# Patient Record
Sex: FEMALE | Race: WHITE | NOT HISPANIC OR LATINO | Employment: FULL TIME | URBAN - NONMETROPOLITAN AREA
[De-identification: names, ages, dates, MRNs, and addresses within clinical notes are randomized per-mention and may not be internally consistent; named-entity substitution may affect disease eponyms.]

---

## 2024-05-06 ENCOUNTER — APPOINTMENT (OUTPATIENT)
Dept: CARDIOLOGY | Facility: HOSPITAL | Age: 59
End: 2024-05-06

## 2024-05-06 ENCOUNTER — HOSPITAL ENCOUNTER (INPATIENT)
Facility: HOSPITAL | Age: 59
LOS: 1 days | Discharge: HOME | End: 2024-05-07
Attending: EMERGENCY MEDICINE | Admitting: INTERNAL MEDICINE

## 2024-05-06 ENCOUNTER — APPOINTMENT (OUTPATIENT)
Dept: RADIOLOGY | Facility: HOSPITAL | Age: 59
End: 2024-05-06

## 2024-05-06 DIAGNOSIS — I26.99 ACUTE PULMONARY EMBOLISM, UNSPECIFIED PULMONARY EMBOLISM TYPE, UNSPECIFIED WHETHER ACUTE COR PULMONALE PRESENT (MULTI): Primary | ICD-10-CM

## 2024-05-06 DIAGNOSIS — I26.94 MULTIPLE SUBSEGMENTAL PULMONARY EMBOLI WITHOUT ACUTE COR PULMONALE (MULTI): ICD-10-CM

## 2024-05-06 LAB
ALBUMIN SERPL BCP-MCNC: 4 G/DL (ref 3.4–5)
ALP SERPL-CCNC: 84 U/L (ref 33–110)
ALT SERPL W P-5'-P-CCNC: 7 U/L (ref 7–45)
ANION GAP SERPL CALC-SCNC: 11 MMOL/L (ref 10–20)
AST SERPL W P-5'-P-CCNC: 10 U/L (ref 9–39)
BASOPHILS # BLD AUTO: 0.01 X10*3/UL (ref 0–0.1)
BASOPHILS NFR BLD AUTO: 0.1 %
BILIRUB SERPL-MCNC: 0.5 MG/DL (ref 0–1.2)
BNP SERPL-MCNC: 22 PG/ML (ref 0–99)
BUN SERPL-MCNC: 6 MG/DL (ref 6–23)
CALCIUM SERPL-MCNC: 8.7 MG/DL (ref 8.6–10.3)
CARDIAC TROPONIN I PNL SERPL HS: 3 NG/L (ref 0–13)
CARDIAC TROPONIN I PNL SERPL HS: 3 NG/L (ref 0–13)
CHLORIDE SERPL-SCNC: 103 MMOL/L (ref 98–107)
CO2 SERPL-SCNC: 25 MMOL/L (ref 21–32)
CREAT SERPL-MCNC: 0.75 MG/DL (ref 0.5–1.05)
EGFRCR SERPLBLD CKD-EPI 2021: >90 ML/MIN/1.73M*2
EOSINOPHIL # BLD AUTO: 0.02 X10*3/UL (ref 0–0.7)
EOSINOPHIL NFR BLD AUTO: 0.2 %
ERYTHROCYTE [DISTWIDTH] IN BLOOD BY AUTOMATED COUNT: 13.8 % (ref 11.5–14.5)
GLUCOSE SERPL-MCNC: 123 MG/DL (ref 74–99)
HCT VFR BLD AUTO: 37.1 % (ref 36–46)
HGB BLD-MCNC: 12.6 G/DL (ref 12–16)
HOLD SPECIMEN: NORMAL
IMM GRANULOCYTES # BLD AUTO: 0.02 X10*3/UL (ref 0–0.7)
IMM GRANULOCYTES NFR BLD AUTO: 0.2 % (ref 0–0.9)
LYMPHOCYTES # BLD AUTO: 1.4 X10*3/UL (ref 1.2–4.8)
LYMPHOCYTES NFR BLD AUTO: 16.9 %
MCH RBC QN AUTO: 30.7 PG (ref 26–34)
MCHC RBC AUTO-ENTMCNC: 34 G/DL (ref 32–36)
MCV RBC AUTO: 91 FL (ref 80–100)
MONOCYTES # BLD AUTO: 0.42 X10*3/UL (ref 0.1–1)
MONOCYTES NFR BLD AUTO: 5.1 %
NEUTROPHILS # BLD AUTO: 6.39 X10*3/UL (ref 1.2–7.7)
NEUTROPHILS NFR BLD AUTO: 77.5 %
NRBC BLD-RTO: 0 /100 WBCS (ref 0–0)
PLATELET # BLD AUTO: 250 X10*3/UL (ref 150–450)
POTASSIUM SERPL-SCNC: 3.8 MMOL/L (ref 3.5–5.3)
PROT SERPL-MCNC: 6.8 G/DL (ref 6.4–8.2)
RBC # BLD AUTO: 4.1 X10*6/UL (ref 4–5.2)
SODIUM SERPL-SCNC: 135 MMOL/L (ref 136–145)
WBC # BLD AUTO: 8.3 X10*3/UL (ref 4.4–11.3)

## 2024-05-06 PROCEDURE — 84484 ASSAY OF TROPONIN QUANT: CPT | Performed by: EMERGENCY MEDICINE

## 2024-05-06 PROCEDURE — 36415 COLL VENOUS BLD VENIPUNCTURE: CPT | Performed by: EMERGENCY MEDICINE

## 2024-05-06 PROCEDURE — 85025 COMPLETE CBC W/AUTO DIFF WBC: CPT | Performed by: EMERGENCY MEDICINE

## 2024-05-06 PROCEDURE — 99285 EMERGENCY DEPT VISIT HI MDM: CPT | Mod: 25

## 2024-05-06 PROCEDURE — 2500000004 HC RX 250 GENERAL PHARMACY W/ HCPCS (ALT 636 FOR OP/ED)

## 2024-05-06 PROCEDURE — 93005 ELECTROCARDIOGRAM TRACING: CPT

## 2024-05-06 PROCEDURE — 71275 CT ANGIOGRAPHY CHEST: CPT | Performed by: STUDENT IN AN ORGANIZED HEALTH CARE EDUCATION/TRAINING PROGRAM

## 2024-05-06 PROCEDURE — 71045 X-RAY EXAM CHEST 1 VIEW: CPT | Performed by: RADIOLOGY

## 2024-05-06 PROCEDURE — 80053 COMPREHEN METABOLIC PANEL: CPT | Performed by: EMERGENCY MEDICINE

## 2024-05-06 PROCEDURE — 71045 X-RAY EXAM CHEST 1 VIEW: CPT

## 2024-05-06 PROCEDURE — 71275 CT ANGIOGRAPHY CHEST: CPT

## 2024-05-06 PROCEDURE — 2550000001 HC RX 255 CONTRASTS: Performed by: EMERGENCY MEDICINE

## 2024-05-06 PROCEDURE — 1200000002 HC GENERAL ROOM WITH TELEMETRY DAILY: Mod: IPSPLIT

## 2024-05-06 PROCEDURE — 2500000001 HC RX 250 WO HCPCS SELF ADMINISTERED DRUGS (ALT 637 FOR MEDICARE OP): Mod: IPSPLIT

## 2024-05-06 PROCEDURE — 83880 ASSAY OF NATRIURETIC PEPTIDE: CPT | Performed by: EMERGENCY MEDICINE

## 2024-05-06 RX ORDER — OXYCODONE HYDROCHLORIDE 5 MG/1
5 TABLET ORAL EVERY 6 HOURS PRN
Status: DISCONTINUED | OUTPATIENT
Start: 2024-05-06 | End: 2024-05-07 | Stop reason: HOSPADM

## 2024-05-06 RX ORDER — ONDANSETRON HYDROCHLORIDE 2 MG/ML
4 INJECTION, SOLUTION INTRAVENOUS EVERY 8 HOURS PRN
Status: DISCONTINUED | OUTPATIENT
Start: 2024-05-06 | End: 2024-05-07 | Stop reason: HOSPADM

## 2024-05-06 RX ORDER — ACETAMINOPHEN 325 MG/1
650 TABLET ORAL EVERY 4 HOURS PRN
Status: DISCONTINUED | OUTPATIENT
Start: 2024-05-06 | End: 2024-05-07 | Stop reason: HOSPADM

## 2024-05-06 RX ORDER — HEPARIN SODIUM 10000 [USP'U]/100ML
0-4500 INJECTION, SOLUTION INTRAVENOUS CONTINUOUS
Status: DISCONTINUED | OUTPATIENT
Start: 2024-05-06 | End: 2024-05-07 | Stop reason: HOSPADM

## 2024-05-06 RX ORDER — TALC
3 POWDER (GRAM) TOPICAL NIGHTLY PRN
Status: DISCONTINUED | OUTPATIENT
Start: 2024-05-06 | End: 2024-05-07 | Stop reason: HOSPADM

## 2024-05-06 RX ORDER — HEPARIN SODIUM 10000 [USP'U]/100ML
INJECTION, SOLUTION INTRAVENOUS
Status: COMPLETED
Start: 2024-05-06 | End: 2024-05-06

## 2024-05-06 RX ORDER — POLYETHYLENE GLYCOL 3350 17 G/17G
17 POWDER, FOR SOLUTION ORAL DAILY
Status: DISCONTINUED | OUTPATIENT
Start: 2024-05-07 | End: 2024-05-07 | Stop reason: HOSPADM

## 2024-05-06 RX ADMIN — HEPARIN SODIUM 1224 UNITS/HR: 10000 INJECTION, SOLUTION INTRAVENOUS at 19:42

## 2024-05-06 RX ADMIN — IOHEXOL 50 ML: 350 INJECTION, SOLUTION INTRAVENOUS at 19:12

## 2024-05-06 RX ADMIN — HEPARIN SODIUM AND DEXTROSE 1224 UNITS/HR: 10000; 5 INJECTION INTRAVENOUS at 19:42

## 2024-05-06 RX ADMIN — OXYCODONE HYDROCHLORIDE 5 MG: 5 TABLET ORAL at 22:24

## 2024-05-06 SDOH — ECONOMIC STABILITY: TRANSPORTATION INSECURITY
IN THE PAST 12 MONTHS, HAS THE LACK OF TRANSPORTATION KEPT YOU FROM MEDICAL APPOINTMENTS OR FROM GETTING MEDICATIONS?: PATIENT DECLINED

## 2024-05-06 SDOH — SOCIAL STABILITY: SOCIAL INSECURITY: DOES ANYONE TRY TO KEEP YOU FROM HAVING/CONTACTING OTHER FRIENDS OR DOING THINGS OUTSIDE YOUR HOME?: NO

## 2024-05-06 SDOH — HEALTH STABILITY: MENTAL HEALTH: HOW OFTEN DO YOU HAVE 6 OR MORE DRINKS ON ONE OCCASION?: NEVER

## 2024-05-06 SDOH — ECONOMIC STABILITY: INCOME INSECURITY: HOW HARD IS IT FOR YOU TO PAY FOR THE VERY BASICS LIKE FOOD, HOUSING, MEDICAL CARE, AND HEATING?: PATIENT DECLINED

## 2024-05-06 SDOH — HEALTH STABILITY: MENTAL HEALTH: HOW OFTEN DO YOU HAVE A DRINK CONTAINING ALCOHOL?: MONTHLY OR LESS

## 2024-05-06 SDOH — SOCIAL STABILITY: SOCIAL INSECURITY: ABUSE: ADULT

## 2024-05-06 SDOH — SOCIAL STABILITY: SOCIAL INSECURITY: ARE YOU OR HAVE YOU BEEN THREATENED OR ABUSED PHYSICALLY, EMOTIONALLY, OR SEXUALLY BY ANYONE?: NO

## 2024-05-06 SDOH — SOCIAL STABILITY: SOCIAL INSECURITY: HAVE YOU HAD THOUGHTS OF HARMING ANYONE ELSE?: NO

## 2024-05-06 SDOH — ECONOMIC STABILITY: TRANSPORTATION INSECURITY
IN THE PAST 12 MONTHS, HAS LACK OF TRANSPORTATION KEPT YOU FROM MEETINGS, WORK, OR FROM GETTING THINGS NEEDED FOR DAILY LIVING?: PATIENT DECLINED

## 2024-05-06 SDOH — SOCIAL STABILITY: SOCIAL INSECURITY: HAVE YOU HAD ANY THOUGHTS OF HARMING ANYONE ELSE?: NO

## 2024-05-06 SDOH — SOCIAL STABILITY: SOCIAL INSECURITY: WERE YOU ABLE TO COMPLETE ALL THE BEHAVIORAL HEALTH SCREENINGS?: YES

## 2024-05-06 SDOH — ECONOMIC STABILITY: HOUSING INSECURITY
IN THE LAST 12 MONTHS, WAS THERE A TIME WHEN YOU DID NOT HAVE A STEADY PLACE TO SLEEP OR SLEPT IN A SHELTER (INCLUDING NOW)?: PATIENT DECLINED

## 2024-05-06 SDOH — SOCIAL STABILITY: SOCIAL INSECURITY: ARE THERE ANY APPARENT SIGNS OF INJURIES/BEHAVIORS THAT COULD BE RELATED TO ABUSE/NEGLECT?: NO

## 2024-05-06 SDOH — SOCIAL STABILITY: SOCIAL INSECURITY: HAS ANYONE EVER THREATENED TO HURT YOUR FAMILY OR YOUR PETS?: NO

## 2024-05-06 SDOH — SOCIAL STABILITY: SOCIAL INSECURITY: DO YOU FEEL UNSAFE GOING BACK TO THE PLACE WHERE YOU ARE LIVING?: NO

## 2024-05-06 SDOH — SOCIAL STABILITY: SOCIAL INSECURITY: DO YOU FEEL ANYONE HAS EXPLOITED OR TAKEN ADVANTAGE OF YOU FINANCIALLY OR OF YOUR PERSONAL PROPERTY?: NO

## 2024-05-06 SDOH — ECONOMIC STABILITY: INCOME INSECURITY: IN THE LAST 12 MONTHS, WAS THERE A TIME WHEN YOU WERE NOT ABLE TO PAY THE MORTGAGE OR RENT ON TIME?: PATIENT DECLINED

## 2024-05-06 SDOH — HEALTH STABILITY: MENTAL HEALTH: HOW MANY STANDARD DRINKS CONTAINING ALCOHOL DO YOU HAVE ON A TYPICAL DAY?: PATIENT DOES NOT DRINK

## 2024-05-06 SDOH — ECONOMIC STABILITY: HOUSING INSECURITY: IN THE LAST 12 MONTHS, HOW MANY PLACES HAVE YOU LIVED?: 1

## 2024-05-06 ASSESSMENT — COLUMBIA-SUICIDE SEVERITY RATING SCALE - C-SSRS
1. IN THE PAST MONTH, HAVE YOU WISHED YOU WERE DEAD OR WISHED YOU COULD GO TO SLEEP AND NOT WAKE UP?: NO
6. HAVE YOU EVER DONE ANYTHING, STARTED TO DO ANYTHING, OR PREPARED TO DO ANYTHING TO END YOUR LIFE?: NO
2. HAVE YOU ACTUALLY HAD ANY THOUGHTS OF KILLING YOURSELF?: NO

## 2024-05-06 ASSESSMENT — COGNITIVE AND FUNCTIONAL STATUS - GENERAL
PATIENT BASELINE BEDBOUND: NO
MOBILITY SCORE: 24
DAILY ACTIVITIY SCORE: 24

## 2024-05-06 ASSESSMENT — LIFESTYLE VARIABLES
AUDIT-C TOTAL SCORE: 1
HOW MANY STANDARD DRINKS CONTAINING ALCOHOL DO YOU HAVE ON A TYPICAL DAY: PATIENT DOES NOT DRINK
SKIP TO QUESTIONS 9-10: 1
HOW OFTEN DO YOU HAVE 6 OR MORE DRINKS ON ONE OCCASION: NEVER

## 2024-05-06 ASSESSMENT — ACTIVITIES OF DAILY LIVING (ADL)
JUDGMENT_ADEQUATE_SAFELY_COMPLETE_DAILY_ACTIVITIES: YES
GROOMING: INDEPENDENT
TOILETING: INDEPENDENT
FEEDING YOURSELF: INDEPENDENT
ADEQUATE_TO_COMPLETE_ADL: YES
DRESSING YOURSELF: INDEPENDENT
BATHING: INDEPENDENT
HEARING - LEFT EAR: FUNCTIONAL
HEARING - RIGHT EAR: FUNCTIONAL
WALKS IN HOME: INDEPENDENT
PATIENT'S MEMORY ADEQUATE TO SAFELY COMPLETE DAILY ACTIVITIES?: YES

## 2024-05-06 ASSESSMENT — PATIENT HEALTH QUESTIONNAIRE - PHQ9
2. FEELING DOWN, DEPRESSED OR HOPELESS: NOT AT ALL
SUM OF ALL RESPONSES TO PHQ9 QUESTIONS 1 & 2: 0
1. LITTLE INTEREST OR PLEASURE IN DOING THINGS: NOT AT ALL

## 2024-05-06 ASSESSMENT — PAIN DESCRIPTION - LOCATION: LOCATION: CHEST

## 2024-05-06 ASSESSMENT — PAIN - FUNCTIONAL ASSESSMENT
PAIN_FUNCTIONAL_ASSESSMENT: 0-10
PAIN_FUNCTIONAL_ASSESSMENT: 0-10

## 2024-05-06 ASSESSMENT — PAIN DESCRIPTION - PAIN TYPE: TYPE: ACUTE PAIN

## 2024-05-06 ASSESSMENT — PAIN SCALES - GENERAL
PAINLEVEL_OUTOF10: 8
PAINLEVEL_OUTOF10: 7

## 2024-05-06 NOTE — ED TRIAGE NOTES
Pt to ED c/o pain with deep breaths and concern for possible blood clot. Per patient, her friend who has history of PE states she has similar symptoms that she had when she had her blood clot. Patient states her pain began last night and she had a hard time getting out of bed this morning. Pt had difficulty catching her breath. Pt states her pain radiates into her left arm and shoulder, as well as through to her mid-back. Pt describes pain as sharp and 9/10. Pt is a former smoker and has been taking her daughter's birth control as hormone replacement.

## 2024-05-06 NOTE — ED PROVIDER NOTES
HPI   Chief Complaint   Patient presents with    Pain With Breathing     Pain worsens with deep breath, radiates into left chest and arm and through back.       HPI                    [place] Coma Scale Score: 15                     Patient History   History reviewed. No pertinent past medical history.  History reviewed. No pertinent surgical history.  No family history on file.  Social History     Tobacco Use    Smoking status: Former     Types: Cigarettes    Smokeless tobacco: Former   Substance Use Topics    Alcohol use: Not on file    Drug use: Not on file       Physical Exam   ED Triage Vitals [05/06/24 1853]   Temperature Heart Rate Respirations BP   36.9 °C (98.4 °F) 89 16 156/77      SpO2 Temp Source Heart Rate Source Patient Position   98 % Temporal Monitor Sitting      BP Location FiO2 (%)     Left arm --       Physical Exam  Vitals and nursing note reviewed.   Constitutional:       General: She is not in acute distress.     Appearance: She is well-developed.   HENT:      Head: Normocephalic and atraumatic.   Eyes:      Conjunctiva/sclera: Conjunctivae normal.   Cardiovascular:      Rate and Rhythm: Normal rate and regular rhythm.      Heart sounds: No murmur heard.  Pulmonary:      Effort: Pulmonary effort is normal. No respiratory distress.      Breath sounds: Normal breath sounds.   Abdominal:      Palpations: Abdomen is soft.      Tenderness: There is no abdominal tenderness.   Musculoskeletal:         General: No swelling.      Cervical back: Neck supple.   Skin:     General: Skin is warm and dry.      Capillary Refill: Capillary refill takes less than 2 seconds.   Neurological:      Mental Status: She is alert.   Psychiatric:         Mood and Affect: Mood normal.         ED Course & MDM   ED Course as of 05/06/24 1947   Mon May 06, 2024   1847 EKG performed at 1847 showing normal sinus rhythm no ST elevation depression ventricular rate is 89 essentially normal EKG interpreted by me [KA]      ED  Course User Index  [KA] Mukul Gerard DO         Diagnoses as of 05/06/24 1947   Acute pulmonary embolism, unspecified pulmonary embolism type, unspecified whether acute cor pulmonale present (Multi)       Medical Decision Making  58-year-old female presents to the ER with chief complaint of left-sided chest pain.  Patient reports also worse taking deep breath then.  Oddly enough patient started taking a friend's birth control medication to help her deal with menopause.  She been on it for the past 20 days.  EKG is unremarkable however patient has a pulmonary embolism.  No evidence of right heart strain will start heparin drip and bolus on the patient she is not hypoxic.  Patient admitted to the hospital for further evaluation and treatment.        Procedure  Procedures     Mukul Gerard DO  05/06/24 1947

## 2024-05-07 VITALS
RESPIRATION RATE: 18 BRPM | TEMPERATURE: 97.5 F | BODY MASS INDEX: 25.05 KG/M2 | OXYGEN SATURATION: 98 % | HEIGHT: 65 IN | HEART RATE: 65 BPM | WEIGHT: 150.35 LBS | SYSTOLIC BLOOD PRESSURE: 102 MMHG | DIASTOLIC BLOOD PRESSURE: 55 MMHG

## 2024-05-07 LAB
ANION GAP SERPL CALC-SCNC: 9 MMOL/L (ref 10–20)
BUN SERPL-MCNC: 5 MG/DL (ref 6–23)
CALCIUM SERPL-MCNC: 8.4 MG/DL (ref 8.6–10.3)
CHLORIDE SERPL-SCNC: 105 MMOL/L (ref 98–107)
CO2 SERPL-SCNC: 27 MMOL/L (ref 21–32)
CREAT SERPL-MCNC: 0.73 MG/DL (ref 0.5–1.05)
EGFRCR SERPLBLD CKD-EPI 2021: >90 ML/MIN/1.73M*2
ERYTHROCYTE [DISTWIDTH] IN BLOOD BY AUTOMATED COUNT: 13.8 % (ref 11.5–14.5)
GLUCOSE SERPL-MCNC: 115 MG/DL (ref 74–99)
HCT VFR BLD AUTO: 34.8 % (ref 36–46)
HGB BLD-MCNC: 11.8 G/DL (ref 12–16)
HOLD SPECIMEN: NORMAL
HOLD SPECIMEN: NORMAL
MCH RBC QN AUTO: 30.8 PG (ref 26–34)
MCHC RBC AUTO-ENTMCNC: 33.9 G/DL (ref 32–36)
MCV RBC AUTO: 91 FL (ref 80–100)
NRBC BLD-RTO: 0 /100 WBCS (ref 0–0)
PLATELET # BLD AUTO: 220 X10*3/UL (ref 150–450)
POTASSIUM SERPL-SCNC: 3.6 MMOL/L (ref 3.5–5.3)
RBC # BLD AUTO: 3.83 X10*6/UL (ref 4–5.2)
SODIUM SERPL-SCNC: 137 MMOL/L (ref 136–145)
UFH PPP CHRO-ACNC: 0.5 IU/ML
UFH PPP CHRO-ACNC: 0.7 IU/ML
UFH PPP CHRO-ACNC: 0.9 IU/ML
WBC # BLD AUTO: 7.9 X10*3/UL (ref 4.4–11.3)

## 2024-05-07 PROCEDURE — 85027 COMPLETE CBC AUTOMATED: CPT | Mod: IPSPLIT

## 2024-05-07 PROCEDURE — 36415 COLL VENOUS BLD VENIPUNCTURE: CPT | Mod: IPSPLIT

## 2024-05-07 PROCEDURE — 85520 HEPARIN ASSAY: CPT | Mod: IPSPLIT

## 2024-05-07 PROCEDURE — 99238 HOSP IP/OBS DSCHRG MGMT 30/<: CPT | Performed by: INTERNAL MEDICINE

## 2024-05-07 PROCEDURE — 2500000001 HC RX 250 WO HCPCS SELF ADMINISTERED DRUGS (ALT 637 FOR MEDICARE OP): Mod: IPSPLIT | Performed by: NURSE PRACTITIONER

## 2024-05-07 PROCEDURE — 99222 1ST HOSP IP/OBS MODERATE 55: CPT | Performed by: INTERNAL MEDICINE

## 2024-05-07 PROCEDURE — 80048 BASIC METABOLIC PNL TOTAL CA: CPT | Mod: IPSPLIT

## 2024-05-07 PROCEDURE — 2500000001 HC RX 250 WO HCPCS SELF ADMINISTERED DRUGS (ALT 637 FOR MEDICARE OP): Mod: IPSPLIT

## 2024-05-07 PROCEDURE — 94760 N-INVAS EAR/PLS OXIMETRY 1: CPT | Mod: IPSPLIT

## 2024-05-07 PROCEDURE — 2500000004 HC RX 250 GENERAL PHARMACY W/ HCPCS (ALT 636 FOR OP/ED): Mod: IPSPLIT

## 2024-05-07 RX ORDER — OXYCODONE AND ACETAMINOPHEN 5; 325 MG/1; MG/1
1 TABLET ORAL EVERY 6 HOURS PRN
Qty: 10 TABLET | Refills: 0 | Status: SHIPPED | OUTPATIENT
Start: 2024-05-07

## 2024-05-07 RX ADMIN — HEPARIN SODIUM AND DEXTROSE 1120 UNITS/HR: 10000; 5 INJECTION INTRAVENOUS at 11:35

## 2024-05-07 RX ADMIN — APIXABAN 10 MG: 5 TABLET, FILM COATED ORAL at 14:23

## 2024-05-07 RX ADMIN — OXYCODONE HYDROCHLORIDE 5 MG: 5 TABLET ORAL at 04:11

## 2024-05-07 RX ADMIN — ACETAMINOPHEN 650 MG: 325 TABLET ORAL at 04:11

## 2024-05-07 ASSESSMENT — PAIN DESCRIPTION - DESCRIPTORS: DESCRIPTORS: SHARP

## 2024-05-07 ASSESSMENT — PAIN SCALES - GENERAL
PAINLEVEL_OUTOF10: 5 - MODERATE PAIN
PAINLEVEL_OUTOF10: 7

## 2024-05-07 ASSESSMENT — PAIN - FUNCTIONAL ASSESSMENT
PAIN_FUNCTIONAL_ASSESSMENT: 0-10
PAIN_FUNCTIONAL_ASSESSMENT: 0-10

## 2024-05-07 NOTE — NURSING NOTE
"Pt admitted to floor, has multiple complaints, very difficult to educate. Complains of severe pain in left shoulder and left \"lung\" that started last night. Says, \"I'm wondering how the pinched nerve in my shoulder made me get a lung clot...\" Attempted to question why the patient believes this, unable to reason or educate.     Requesting \"pain medication\" but is skeptical that the ordered 5mg oxycodone is too much, \"I hate feeling loopy/out of it, I don't want anything too strong!\" Offered pt Tylenol, but pt refused saying that was not strong enough. Eventually took ordered oxy - later says it \"did nothing.\" Pt is very argumentative and opinionated with staff.     [place], has a boyfriend who lives over an hour away in [place].  "

## 2024-05-07 NOTE — DISCHARGE SUMMARY
"Discharge Diagnosis  Acute pulmonary embolism, unspecified pulmonary embolism type, unspecified whether acute cor pulmonale present (Multi)    Issues Requiring Follow-Up  PCP    Test Results Pending At Discharge  Pending Labs       No current pending labs.            Hospital Course   Brinda Mckinnon is a 58 y.o. female  with PMHx significant for cigarette smoking who presented to [facility]  due to having pain in her left shoulder and chest when laying down. She expressed that she had started taking birth control about twenty days ago because of brain fog and generalized laziness because of her menopause. She expressed they really helped but then developed the chest pain and left shoulder pain. She does not have a PCP and expressed she doesn't go to doctors. She was initiated on heparin drip. Eliquis ordered for the PE. She did express concern about the cost of the medications. Risks and benefits discussed. Coupon provided for eliquis.     Patient was adamant that she was neither going to take the medication, nor was she going to establish a PCP, because \"I don't go to doctors\".  I expalined to her that she is an adult and can make those decisions for herself, but I nevertheless would cribe the medication and make the appointment, as that is the standard of care.  I further explained that not following it could result in stroke, saddle embolus, heart attack, and death. She said she is willing to take the risk.      Pertinent Physical Exam At Time of Discharge  Constitutional:       Appearance: Normal appearance. She is normal weight.   HENT:      Head: Atraumatic.      Nose: Nose normal.      Mouth/Throat:      Mouth: Mucous membranes are moist.   Eyes:      Extraocular Movements: Extraocular movements intact.      Pupils: Pupils are equal, round, and reactive to light.   Cardiovascular:      Rate and Rhythm: Normal rate and regular rhythm.      Pulses: Normal pulses.   Pulmonary:      Effort: Pulmonary effort is " normal.      Breath sounds: Normal breath sounds.   Abdominal:      General: Bowel sounds are normal.      Palpations: Abdomen is soft.   Musculoskeletal:         General: Normal range of motion.      Cervical back: Normal range of motion.   Skin:     General: Skin is warm and dry.      Capillary Refill: Capillary refill takes less than 2 seconds.   Neurological:      General: No focal deficit present.      Mental Status: She is alert.   Psychiatric:         Behavior: Behavior normal.     Home Medications     Medication List      START taking these medications     * apixaban 5 mg tablet; Commonly known as: Eliquis; Take 2 tablets (10   mg) by mouth 2 times a day for 7 days, THEN 1 tablet (5 mg) 2 times a   day.; Start taking on: May 7, 2024   * apixaban 5 mg tablet; Commonly known as: Eliquis; Take 1 tablet (5 mg)   by mouth 2 times a day. Do not fill before June 6, 2024.; Start taking on:   June 6, 2024   oxyCODONE-acetaminophen 5-325 mg tablet; Commonly known as: Percocet;   Take 1 tablet by mouth every 6 hours if needed for moderate pain (4 - 6)   or severe pain (7 - 10).  * This list has 2 medication(s) that are the same as other medications   prescribed for you. Read the directions carefully, and ask your doctor or   other care provider to review them with you.     #idiopathic PE  ~2/2 taking birth control post menopause  ~eliquis minimum 90 days.   ~will need to obtain PCP for continued care.       Disposition: Discharge on Eliquis      Андрей Chu MD

## 2024-05-07 NOTE — CARE PLAN
"The patient's goals for the shift include  NA    The clinical goals for the shift include Pt will remain stable on RA this shift    Pt stable on RA, slept throughout shift, tolerating IV Heparin. Oxy and Tylenol given for left shoulder/\"lung\" pain.    "

## 2024-05-07 NOTE — CARE PLAN
The patient's goals for the shift include      The clinical goals for the shift include pt sa02 will be > 94% on room air for this shift    Over the shift, the patient did not make progress toward the following goals. Barriers to progression include . Recommendations to address these barriers include .    0800 Pt is awake and alert, complains of shoulder pain, but refusing pain medication at this time   Remians on heparin,  denies SOB or chest pain.      1300 physician in to see patient,  updated on theraputic heparin, discussed with patient the need for the anticoagulant to resolve her PE.  Patient appears to understand,  pt given   Eliquis voucher given to patient by physician Dr Chu     1450 pt given a paper prescription for percocet by Dr Chu, for shoulder pain ,   Pt walked out, with escort of Lilian SCHAFER.   No complaints of pain

## 2024-05-07 NOTE — PROGRESS NOTES
05/07/24 1451   Discharge Planning   Living Arrangements Spouse/significant other   Support Systems Spouse/significant other   Assistance Needed independent   Type of Residence Private residence   Patient expects to be discharged to: home w/coupon for Eloquis   Does the patient need discharge transport arranged? No     Pt was given script for Eliquis and a coupon for 30 days free. She will have to take this for 3-6 mos. She was given the number for patient assistance at the [employer] To call for continued support. Spouse is here for transport home today. GLADIS Mullen

## 2024-05-07 NOTE — H&P
History of Present Illness  Brinda Mckinnon is a 58 y.o. female  with PMHx significant for cigarette smoking who presented to [facility]  due to having pain in her left shoulder and chest when laying down. She expressed that she had started taking birth control about twenty days ago because of brain fog and generalized laziness because of her menopause. She expressed they really helped but then developed the chest pain and left shoulder pain. She does not have a PCP and expressed she doesn't go to doctors. She was initiated on heparin drip. Eliquis ordered for the PE. She did express concern about the cost of the medications. Risks and benefits discussed. Coupon provided for eliquis.     12 Point ROS negative unless noted in above HPI     Past Medical History  History reviewed. No pertinent past medical history.    Surgical History  History reviewed. No pertinent surgical history.     Social History  She reports that she has quit smoking. Her smoking use included cigarettes. She has quit using smokeless tobacco. No history on file for alcohol use and drug use.    Allergies  Patient has no known allergies.     Physical Exam  Constitutional:       Appearance: Normal appearance. She is normal weight.   HENT:      Head: Atraumatic.      Nose: Nose normal.      Mouth/Throat:      Mouth: Mucous membranes are moist.   Eyes:      Extraocular Movements: Extraocular movements intact.      Pupils: Pupils are equal, round, and reactive to light.   Cardiovascular:      Rate and Rhythm: Normal rate and regular rhythm.      Pulses: Normal pulses.   Pulmonary:      Effort: Pulmonary effort is normal.      Breath sounds: Normal breath sounds.   Abdominal:      General: Bowel sounds are normal.      Palpations: Abdomen is soft.   Musculoskeletal:         General: Normal range of motion.      Cervical back: Normal range of motion.   Skin:     General: Skin is warm and dry.      Capillary Refill: Capillary refill takes less than 2  "seconds.   Neurological:      General: No focal deficit present.      Mental Status: She is alert.   Psychiatric:         Behavior: Behavior normal.          Last Recorded Vitals  Blood pressure 102/55, pulse 65, temperature 36.4 °C (97.5 °F), temperature source Temporal, resp. rate 18, height 1.651 m (5' 5\"), weight 68 kg (150 lb), SpO2 98%.    Relevant Results  Scheduled medications  apixaban, 10 mg, oral, BID   Followed by  [START ON 5/14/2024] apixaban, 5 mg, oral, BID  [START ON 6/6/2024] apixaban, 5 mg, oral, BID  polyethylene glycol, 17 g, oral, Daily      Continuous medications  heparin, 0-4,500 Units/hr, Last Rate: 1,120 Units/hr (05/07/24 1135)      PRN medications  PRN medications: acetaminophen, heparin, melatonin, ondansetron, oxyCODONE     Results for orders placed or performed during the hospital encounter of 05/06/24 (from the past 24 hour(s))   CBC with Differential   Result Value Ref Range    WBC 8.3 4.4 - 11.3 x10*3/uL    nRBC 0.0 0.0 - 0.0 /100 WBCs    RBC 4.10 4.00 - 5.20 x10*6/uL    Hemoglobin 12.6 12.0 - 16.0 g/dL    Hematocrit 37.1 36.0 - 46.0 %    MCV 91 80 - 100 fL    MCH 30.7 26.0 - 34.0 pg    MCHC 34.0 32.0 - 36.0 g/dL    RDW 13.8 11.5 - 14.5 %    Platelets 250 150 - 450 x10*3/uL    Neutrophils % 77.5 40.0 - 80.0 %    Immature Granulocytes %, Automated 0.2 0.0 - 0.9 %    Lymphocytes % 16.9 13.0 - 44.0 %    Monocytes % 5.1 2.0 - 10.0 %    Eosinophils % 0.2 0.0 - 6.0 %    Basophils % 0.1 0.0 - 2.0 %    Neutrophils Absolute 6.39 1.20 - 7.70 x10*3/uL    Immature Granulocytes Absolute, Automated 0.02 0.00 - 0.70 x10*3/uL    Lymphocytes Absolute 1.40 1.20 - 4.80 x10*3/uL    Monocytes Absolute 0.42 0.10 - 1.00 x10*3/uL    Eosinophils Absolute 0.02 0.00 - 0.70 x10*3/uL    Basophils Absolute 0.01 0.00 - 0.10 x10*3/uL   Comprehensive Metabolic Panel   Result Value Ref Range    Glucose 123 (H) 74 - 99 mg/dL    Sodium 135 (L) 136 - 145 mmol/L    Potassium 3.8 3.5 - 5.3 mmol/L    Chloride 103 98 - 107 " mmol/L    Bicarbonate 25 21 - 32 mmol/L    Anion Gap 11 10 - 20 mmol/L    Urea Nitrogen 6 6 - 23 mg/dL    Creatinine 0.75 0.50 - 1.05 mg/dL    eGFR >90 >60 mL/min/1.73m*2    Calcium 8.7 8.6 - 10.3 mg/dL    Albumin 4.0 3.4 - 5.0 g/dL    Alkaline Phosphatase 84 33 - 110 U/L    Total Protein 6.8 6.4 - 8.2 g/dL    AST 10 9 - 39 U/L    Bilirubin, Total 0.5 0.0 - 1.2 mg/dL    ALT 7 7 - 45 U/L   Brain Natriuretic Peptide   Result Value Ref Range    BNP 22 0 - 99 pg/mL   Troponin I, High Sensitivity, Initial   Result Value Ref Range    Troponin I, High Sensitivity 3 0 - 13 ng/L   Light Blue Top   Result Value Ref Range    Extra Tube Hold for add-ons.    Red Top   Result Value Ref Range    Extra Tube Hold for add-ons.    Gray Top   Result Value Ref Range    Extra Tube Hold for add-ons.    ECG 12 lead   Result Value Ref Range    Ventricular Rate 89 BPM    Atrial Rate 89 BPM    GA Interval 148 ms    QRS Duration 84 ms    QT Interval 374 ms    QTC Calculation(Bazett) 455 ms    P Axis 82 degrees    R Axis 80 degrees    T Axis 67 degrees    QRS Count 15 beats    Q Onset 220 ms    P Onset 146 ms    P Offset 203 ms    T Offset 407 ms    QTC Fredericia 426 ms   Troponin, High Sensitivity, 1 Hour   Result Value Ref Range    Troponin I, High Sensitivity 3 0 - 13 ng/L   Heparin Assay, UFH   Result Value Ref Range    Heparin Unfractionated 0.9 See Comment Below for Therapeutic Ranges IU/mL   Heparin Assay, UFH   Result Value Ref Range    Heparin Unfractionated 0.7 See Comment Below for Therapeutic Ranges IU/mL   CBC   Result Value Ref Range    WBC 7.9 4.4 - 11.3 x10*3/uL    nRBC 0.0 0.0 - 0.0 /100 WBCs    RBC 3.83 (L) 4.00 - 5.20 x10*6/uL    Hemoglobin 11.8 (L) 12.0 - 16.0 g/dL    Hematocrit 34.8 (L) 36.0 - 46.0 %    MCV 91 80 - 100 fL    MCH 30.8 26.0 - 34.0 pg    MCHC 33.9 32.0 - 36.0 g/dL    RDW 13.8 11.5 - 14.5 %    Platelets 220 150 - 450 x10*3/uL   Basic metabolic panel   Result Value Ref Range    Glucose 115 (H) 74 - 99 mg/dL     Sodium 137 136 - 145 mmol/L    Potassium 3.6 3.5 - 5.3 mmol/L    Chloride 105 98 - 107 mmol/L    Bicarbonate 27 21 - 32 mmol/L    Anion Gap 9 (L) 10 - 20 mmol/L    Urea Nitrogen 5 (L) 6 - 23 mg/dL    Creatinine 0.73 0.50 - 1.05 mg/dL    eGFR >90 >60 mL/min/1.73m*2    Calcium 8.4 (L) 8.6 - 10.3 mg/dL   Heparin Assay, UFH   Result Value Ref Range    Heparin Unfractionated 0.5 See Comment Below for Therapeutic Ranges IU/mL   Green Top   Result Value Ref Range    Extra Tube Hold for add-ons.    Lavender Top   Result Value Ref Range    Extra Tube Hold for add-ons.         Imaging  ECG 12 lead    Result Date: 5/7/2024  Normal sinus rhythm Normal ECG No previous ECGs available    CT angio chest for pulmonary embolism    Result Date: 5/6/2024  Interpreted By:  Chuy Martin, STUDY: CT ANGIO CHEST FOR PULMONARY EMBOLISM;  5/6/2024 7:11 pm   INDICATION: Signs/Symptoms:sob.   COMPARISON: None.   ACCESSION NUMBER(S): [id number]   ORDERING CLINICIAN: LEVI ZALDIVAR   TECHNIQUE: Helical data acquisition of the chest was obtained after administration of 50ml IV Omnipaque 350. Images were reformatted in coronal and sagittal planes. Axial and coronal MIP images were created and reviewed.   FINDINGS: POTENTIAL LIMITATIONS OF THE STUDY: None   HEART AND VESSELS: Filling defects within the bilateral lower lobe segmental pulmonary arteries (series 401 images [id number], series 403 images 76-89), compatible with acute pulmonary emboli.   Main pulmonary artery and its branches are normal in caliber.   The thoracic aorta is of normal course and caliber without vascular calcifications.   No coronary artery calcifications are seen.The study is not optimized for evaluation of coronary arteries.   The cardiac chambers are not enlarged.   No evidence of pericardial effusion.   MEDIASTINUM AND MELY, LOWER NECK AND AXILLA: The visualized thyroid gland is within normal limits.   Prominent-mildly enlarged mediastinal nodes. For example 10 mm right  upper para-aortic node (series 401, image 57); 14 mm subcarinal node image 94) and 10 mm right hilar node (image 107).   Esophagus appears within normal limits as seen.   LUNGS AND AIRWAYS: The trachea and central airways are patent. No endobronchial lesion.Mild bibasilar atelectasis. No focal consolidation. No pneumothorax. No pleural effusion. No suspicious pulmonary nodules.   UPPER ABDOMEN: Gallstone at the gallbladder neck and calcified wall of the fundus   CHEST WALL AND OSSEOUS STRUCTURES: There are no suspicious osseous lesions. Multilevel degenerative changes are present       1.  Acute pulmonary emboli in the bilateral lower lobe segmental pulmonary arteries. No right heart strain. 2. Multiple prominent-mildly enlarged likely reactive lymph nodes. 3. Cholelithiasis and porcelain gallbladder.   Chuy Martin discussed the significance and urgency of this critical finding by telephone with  LEVI ZALDIVAR on 5/6/2024 at 7:34 pm.  (**-RCF-**) Findings:  See findings.   Signed by: Chuy Martin 5/6/2024 7:37 PM Dictation workstation:   [id number]    XR chest 1 view    Result Date: 5/6/2024  Interpreted By:  Emory Gonzalez, STUDY: XR CHEST 1 VIEW;  5/6/2024 6:56 pm   INDICATION: Signs/Symptoms:Chest Pain.   COMPARISON: None.   ACCESSION NUMBER(S): [id number]   ORDERING CLINICIAN: LEVI ZALDIVAR   FINDINGS: Multiple overlying leads are present.   CARDIOMEDIASTINAL SILHOUETTE: Cardiomediastinal silhouette is normal in size and configuration.   LUNGS: No consolidation, pleural effusion or pneumothorax.   ABDOMEN: No remarkable upper abdominal findings.   BONES: No acute osseous abnormality.       No acute cardiopulmonary process.   MACRO: None   Signed by: Emory Gonzalez 5/6/2024 7:05 PM Dictation workstation:   [id number]      Assessment/Plan      #PE  ~2/2 taking birth control post menopause  ~eliquis minimum 90 days.   ~will need to obtain PCP for continued care.      Disposition: Discharge on  [facility]  Internal Medicine  Patient was seen in collaboration with the Jolene DREW.  I was present for the pertinent components of the history, physical, and medical decision making and independently examined the patient.  We reviewed the medication reconciliation list and ancillary studies, including lab, imaging, and microbiology, as well as discussed the differential diagnoses; which includes, but is not limited to Principal Problem:    Acute pulmonary embolism, unspecified pulmonary embolism type, unspecified whether acute cor pulmonale present (Multi)  .      I agree with her plan as documented in the electronic medical record.    Андрей Chu MD

## 2024-05-08 LAB
ATRIAL RATE: 89 BPM
P AXIS: 82 DEGREES
P OFFSET: 203 MS
P ONSET: 146 MS
PR INTERVAL: 148 MS
Q ONSET: 220 MS
QRS COUNT: 15 BEATS
QRS DURATION: 84 MS
QT INTERVAL: 374 MS
QTC CALCULATION(BAZETT): 455 MS
QTC FREDERICIA: 426 MS
R AXIS: 80 DEGREES
T AXIS: 67 DEGREES
T OFFSET: 407 MS
VENTRICULAR RATE: 89 BPM